# Patient Record
Sex: MALE | Race: WHITE | NOT HISPANIC OR LATINO
[De-identification: names, ages, dates, MRNs, and addresses within clinical notes are randomized per-mention and may not be internally consistent; named-entity substitution may affect disease eponyms.]

---

## 2022-11-17 PROBLEM — Z00.00 ENCOUNTER FOR PREVENTIVE HEALTH EXAMINATION: Status: ACTIVE | Noted: 2022-11-17

## 2022-11-23 ENCOUNTER — APPOINTMENT (OUTPATIENT)
Dept: ORTHOPEDIC SURGERY | Facility: CLINIC | Age: 57
End: 2022-11-23

## 2022-11-23 DIAGNOSIS — Z86.39 PERSONAL HISTORY OF OTHER ENDOCRINE, NUTRITIONAL AND METABOLIC DISEASE: ICD-10-CM

## 2022-11-23 PROCEDURE — 99204 OFFICE O/P NEW MOD 45 MIN: CPT

## 2022-11-23 PROCEDURE — 99072 ADDL SUPL MATRL&STAF TM PHE: CPT

## 2022-11-23 PROCEDURE — 73564 X-RAY EXAM KNEE 4 OR MORE: CPT | Mod: RT

## 2022-11-23 NOTE — PHYSICAL EXAM
[Right] : right knee [] : patient ambulates without assistive device [TWNoteComboBox7] : flexion 100 degrees [de-identified] : extension 0 degrees

## 2022-11-23 NOTE — WORK
[Sprain/Strain] : sprain/strain [Was the competent medical cause of the injury] : was the competent medical cause of the injury [Are consistent with the injury] : are consistent with the injury [Consistent with my objective findings] : consistent with my objective findings [Total] : total [I provided the services listed above] :  I provided the services listed above. [FreeTextEntry1] : oow until further notice.

## 2022-11-23 NOTE — ASSESSMENT
[FreeTextEntry1] : Underlying pathology reviewed and treatment options discussed. \par 11/23/2022 : xrays right knee, 4 views,  reveal negative\par Obtain MRI right knee r/o MMT vs subchondral edema\par Activity modifier as tolerated.\par He tried 1 wk vacation to rest the knee. He does not feel ready to RTO. \par OOW until further notice. \par resume OTC nsaids use. \par Questions addressed.\par \par The documentation recorded by the scribe accurately reflects the service I personally performed and the decisions made by me.\par I, Willard Hernández, attest that this documentation has been prepared under the direction and in the presence of Provider Kaveh Galindo MD.\par \par The patient was seen by Kaveh Galindo MD.\par The following radiographs were ordered and read by me during this patient's visit. I reviewed each radiograph in detail with the patient, and discussed the findings as highlighted below.\par

## 2022-11-23 NOTE — DISCUSSION/SUMMARY
[de-identified] : The documentation recorded by the scribe accurately reflects the service I personally performed and the decisions made by me.\par I, Willard Hernández, attest that this documentation has been prepared under the direction and in the presence of Provider Kaveh Galindo MD.\par \par The patient was seen by Kaveh Galindo MD.\par The following radiographs were ordered and read by me during this patient's visit. I reviewed each radiograph in detail with the patient, and discussed the findings as highlighted below.\par

## 2022-11-23 NOTE — REASON FOR VISIT
[FreeTextEntry2] : DOI 11/7/22\par 11/23/2022: This is a 57 year year old male, c/o right knee pain after missing a rung while going down the ladder. no hx of physician guided treatment for the right knee. Noted soreness gradully worsening. There is previous NSAIDs use, There is ice therapy. no night symptoms. There is c/o of warmness. There can be locking/catching/grinding symptoms. \par \par Occupation:

## 2022-11-23 NOTE — HISTORY OF PRESENT ILLNESS
[Not working due to injury] : Work status: not working due to injury [Work related] : work related [8] : 8 [4] : 4 [Rest] : rest [Meds] : meds [Ice] : ice [Standing] : standing [] : no [FreeTextEntry2] : right knee [FreeTextEntry6] : warm feeling, and soreness, tingling.

## 2022-11-29 ENCOUNTER — FORM ENCOUNTER (OUTPATIENT)
Age: 57
End: 2022-11-29

## 2022-11-30 ENCOUNTER — TRANSCRIPTION ENCOUNTER (OUTPATIENT)
Age: 57
End: 2022-11-30

## 2022-11-30 ENCOUNTER — APPOINTMENT (OUTPATIENT)
Dept: MRI IMAGING | Facility: CLINIC | Age: 57
End: 2022-11-30

## 2022-11-30 PROCEDURE — 99072 ADDL SUPL MATRL&STAF TM PHE: CPT

## 2022-11-30 PROCEDURE — 73721 MRI JNT OF LWR EXTRE W/O DYE: CPT | Mod: RT

## 2022-12-07 ENCOUNTER — APPOINTMENT (OUTPATIENT)
Dept: ORTHOPEDIC SURGERY | Facility: CLINIC | Age: 57
End: 2022-12-07

## 2022-12-07 ENCOUNTER — NON-APPOINTMENT (OUTPATIENT)
Age: 57
End: 2022-12-07

## 2022-12-07 VITALS — BODY MASS INDEX: 30.82 KG/M2 | WEIGHT: 185 LBS | HEIGHT: 65 IN

## 2022-12-07 PROCEDURE — 99214 OFFICE O/P EST MOD 30 MIN: CPT

## 2022-12-07 PROCEDURE — 99072 ADDL SUPL MATRL&STAF TM PHE: CPT

## 2022-12-07 RX ORDER — MELOXICAM 15 MG/1
15 TABLET ORAL
Qty: 30 | Refills: 0 | Status: COMPLETED | COMMUNITY
Start: 2022-12-07 | End: 2023-01-06

## 2022-12-07 RX ORDER — SIMVASTATIN 10 MG/1
10 TABLET, FILM COATED ORAL
Qty: 30 | Refills: 0 | Status: ACTIVE | COMMUNITY
Start: 2022-08-09

## 2022-12-07 NOTE — ASSESSMENT
[FreeTextEntry1] : Underlying pathology reviewed and treatment options discussed. \par 11/23/2022 : xrays right knee, 4 views,  reveal negative\par Obtain MRI right knee r/o MMT vs subchondral edema\par Activity modifier as tolerated.\par He tried 1 wk vacation to rest the knee. He does not feel ready to RTO. \par OOW until further notice. \par resume OTC nsaids use. \par \par \par 12/07/2022: MRI reviewed and discussed.\par Prescribed mobic to help reduce subchondral edema. \par He is out of work until further notice. Re-eval in 4 wks. \par Light duty is not aval to him. \par Questions addressed.\par INtensist of pain should diminish with time. To soon to tell if arthroscopic intervention is indicated. \par A repeat MRI may be needed in the future to eval subchondral edema. \par Questions addressed.\par \par \par The documentation recorded by the scribe accurately reflects the service I personally performed and the decisions made by me.\par I, Willard Hernández, attest that this documentation has been prepared under the direction and in the presence of Provider Kaveh Galindo MD.\par \par The patient was seen by Kaveh Galindo MD.\par The following radiographs were ordered and read by me during this patient's visit. I reviewed each radiograph in detail with the patient, and discussed the findings as highlighted below.\par

## 2022-12-07 NOTE — DATA REVIEWED
[MRI] : MRI [Right] : of the right [Knee] : knee [Report was reviewed and noted in the chart] : The report was reviewed and noted in the chart [I reviewed the films/CD and agree] : I reviewed the films/CD and agree [FreeTextEntry1] : Impression: \par 1. Severe marrow edema throughout the medial femoral condyle with nonspecific subchondral low signal \par measuring 1.3 cm in the central to posterior aspect without collapse or full-thickness chondral defect potentially \par representing non-collapsed AVN or nondisplaced subchondral fracture.\par 2. Complex medial meniscal tearing and mild surrounding synovitis along with mild ACL sprain, mild MCL \par sprain, medial soft tissue swelling and mild effusion and synovitis.\par 3. Extensor mechanism tendinopathy without tear.\par 4. No evidence of lateral meniscal tear or loose body.\par 5. Clinical correlation and follow up is recommended. Consider repeat MRI to further evaluate for healing as \par clinically indicated.\par E-signed by Leif Alves MD 12/01/2022 9:22:38 AM\par

## 2022-12-07 NOTE — PHYSICAL EXAM
[Right] : right knee [] : ligamentously not stable [TWNoteComboBox7] : flexion 100 degrees [de-identified] : extension 0 degrees

## 2022-12-07 NOTE — WORK
[Sprain/Strain] : sprain/strain [Was the competent medical cause of the injury] : was the competent medical cause of the injury [Are consistent with the injury] : are consistent with the injury [Consistent with my objective findings] : consistent with my objective findings [Total] : total [I provided the services listed above] :  I provided the services listed above. [FreeTextEntry1] : oow until re-evaluation in 4 wks.

## 2022-12-07 NOTE — HISTORY OF PRESENT ILLNESS
[Work related] : work related [8] : 8 [4] : 4 [Rest] : rest [Meds] : meds [Ice] : ice [Standing] : standing [Not working due to injury] : Work status: not working due to injury [de-identified] : DOI 11/7/22\par 11/23/2022: This is a 57 year year old male, c/o right knee pain after missing a rung while going down the ladder. no hx of physician guided treatment for the right knee. Noted soreness gradully worsening. There is previous NSAIDs use, There is ice therapy. no night symptoms. There is c/o of warmness. There can be locking/catching/grinding symptoms. \par \par Occupation: . \par  [] : no [FreeTextEntry2] : right knee [FreeTextEntry5] : Patient is here today for MRI results of right knee. Patient states there has been no improvement in pain since last visit.\par  [FreeTextEntry6] : warm feeling, and soreness, tingling.

## 2023-01-03 ENCOUNTER — APPOINTMENT (OUTPATIENT)
Dept: ORTHOPEDIC SURGERY | Facility: CLINIC | Age: 58
End: 2023-01-03
Payer: OTHER MISCELLANEOUS

## 2023-01-03 VITALS — WEIGHT: 185 LBS | BODY MASS INDEX: 30.82 KG/M2 | HEIGHT: 65 IN

## 2023-01-03 DIAGNOSIS — R93.7 ABNORMAL FINDINGS ON DIAGNOSTIC IMAGING OF OTHER PARTS OF MUSCULOSKELETAL SYSTEM: ICD-10-CM

## 2023-01-03 DIAGNOSIS — S86.911D STRAIN OF UNSPECIFIED MUSCLE(S) AND TENDON(S) AT LOWER LEG LEVEL, RIGHT LEG, SUBSEQUENT ENCOUNTER: ICD-10-CM

## 2023-01-03 DIAGNOSIS — S83.231A COMPLEX TEAR OF MEDIAL MENISCUS, CURRENT INJURY, RIGHT KNEE, INITIAL ENCOUNTER: ICD-10-CM

## 2023-01-03 DIAGNOSIS — S83.231D COMPLEX TEAR OF MEDIAL MENISCUS, CURRENT INJURY, RIGHT KNEE, SUBSEQUENT ENCOUNTER: ICD-10-CM

## 2023-01-03 PROCEDURE — 99214 OFFICE O/P EST MOD 30 MIN: CPT

## 2023-01-03 PROCEDURE — 99072 ADDL SUPL MATRL&STAF TM PHE: CPT

## 2023-01-03 NOTE — WORK
[Total] : total [I provided the services listed above] :  I provided the services listed above. [FreeTextEntry1] : oow until re-evaluation in 4 wks.

## 2023-01-03 NOTE — PHYSICAL EXAM
[Right] : right knee [] : no erythema [TWNoteComboBox7] : flexion 120 degrees [de-identified] : extension 0 degrees

## 2023-01-03 NOTE — HISTORY OF PRESENT ILLNESS
[Work related] : work related [6] : 6 [1] : 2 [Dull/Aching] : dull/aching [Intermittent] : intermittent [Leisure] : leisure [Rest] : rest [Meds] : meds [Ice] : ice [Standing] : standing [Not working due to injury] : Work status: not working due to injury [de-identified] : DOI 11/7/22\par 1/3/22: Follow up right knee. Symptoms continue, but have been improving. Still notes difficulty with stairs and increased activity.\par \par 11/23/2022: This is a 57 year year old male, c/o right knee pain after missing a rung while going down the ladder. no hx of physician guided treatment for the right knee. Noted soreness gradully worsening. There is previous NSAIDs use, There is ice therapy. no night symptoms. There is c/o of warmness. There can be locking/catching/grinding symptoms. \par \par Occupation: . \par  [] : no [FreeTextEntry2] : right knee [FreeTextEntry3] : 11/7/22 [FreeTextEntry5] : ANNIE is a 57 year old M who is here today for WC follow up of right knee. Patient states that the pain has improved since last visit.\par  [FreeTextEntry6] : warm feeling, and soreness, tingling.

## 2023-01-03 NOTE — ASSESSMENT
[FreeTextEntry1] : Underlying pathology reviewed and treatment options discussed. \par 11/23/2022 : xrays right knee, 4 views,  reveal negative\par Obtain MRI right knee r/o MMT vs subchondral edema\par Activity modifier as tolerated.\par He tried 1 wk vacation to rest the knee. He does not feel ready to RTO. \par OOW until further notice. \par resume OTC nsaids use. \par \par \par 12/07/2022: MRI reviewed and discussed.\par Prescribed mobic to help reduce subchondral edema. \par He is out of work until further notice. Re-eval in 4 wks. \par Light duty is not aval to him. \par INtensist of pain should diminish with time. To soon to tell if arthroscopic intervention is indicated. \par A repeat MRI may be needed in the future to eval subchondral edema. \par \par 1/3/22: Symptoms have started to improve.\par Will remain oow at this time with hopes to return after next re-eval.\par Follow up in 4-6 weeks.\par \par 01/03/2023: approx 50% better since last visit. \par Expect further gains due to his bone contusion. \[facility] need for [facility],\par if pain persist especially in MFC an updated MRI may be needed. \par PLan to RTW on Jan 30th. If more time is needed to comforably RTW call to extend RTW date.\par RTW 6 wks for f/u. \par Questions addressed.\par \par The documentation recorded by the scribe accurately reflects the service I personally performed and the decisions made by me.\par I, Leon Hernándezibromina, attest that this documentation has been prepared under the direction and in the presence of Provider Kaveh Galindo MD.\par \par The patient was seen by Kaveh Galindo MD.\par

## 2023-02-08 ENCOUNTER — APPOINTMENT (OUTPATIENT)
Dept: ORTHOPEDIC SURGERY | Facility: CLINIC | Age: 58
End: 2023-02-08